# Patient Record
Sex: FEMALE | Race: OTHER | URBAN - METROPOLITAN AREA
[De-identification: names, ages, dates, MRNs, and addresses within clinical notes are randomized per-mention and may not be internally consistent; named-entity substitution may affect disease eponyms.]

---

## 2019-10-08 ENCOUNTER — OFFICE VISIT (OUTPATIENT)
Dept: VASCULAR SURGERY | Age: 59
End: 2019-10-08

## 2019-10-08 VITALS
HEART RATE: 66 BPM | SYSTOLIC BLOOD PRESSURE: 124 MMHG | DIASTOLIC BLOOD PRESSURE: 80 MMHG | WEIGHT: 143 LBS | RESPIRATION RATE: 14 BRPM | HEIGHT: 63 IN | BODY MASS INDEX: 25.34 KG/M2

## 2019-10-08 DIAGNOSIS — I87.2 VENOUS (PERIPHERAL) INSUFFICIENCY: ICD-10-CM

## 2019-10-08 DIAGNOSIS — I83.813 VARICOSE VEINS OF BOTH LOWER EXTREMITIES WITH PAIN: Primary | ICD-10-CM

## 2019-10-08 DIAGNOSIS — M54.30 SCIATICA, UNSPECIFIED LATERALITY: ICD-10-CM

## 2019-10-08 RX ORDER — DEXTROAMPHETAMINE SACCHARATE, AMPHETAMINE ASPARTATE MONOHYDRATE, DEXTROAMPHETAMINE SULFATE AND AMPHETAMINE SULFATE 7.5; 7.5; 7.5; 7.5 MG/1; MG/1; MG/1; MG/1
CAPSULE, EXTENDED RELEASE ORAL
Refills: 0 | COMMUNITY
Start: 2019-09-03

## 2019-10-09 NOTE — PROGRESS NOTES
Teddy Jacobsen    Chief Complaint   Patient presents with    New Patient    Varicose Veins       HPI    Teddy Jacobsen is a 64 y.o. female who presents to the office today with complaint of painful varicose veins in the bilateral lower extremities. Patient states that she recently moved to the area from [place]. Reportedly while she was in [place] she had some veins of her right lower leg and injected by an anesthesiologist.  She states that she did not have good result with this as she developed pain and bruising at this site which she feels has not resolved fully. 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 She does get some swelling mostly in the ankle and lower leg region. She feels that her foot is somewhat spared. She has tried compression stockings in the past but states they make her feet go numb. She does also report a history of sciatica which she states will flare intermittently if she standing too long for work. She does also have bunions and follows with podiatry for this. She states that the compression stockings also bother her bunions. She does have varicosities which are painful to her mostly in the lower legs. She does state that her father had some varicose veins as well. She has no personal history of DVT. No fevers or chills reported. She has no history of nonhealing ulcers. She does not describe any symptoms of claudication and certainly has no true rest pain. She does complain of some intermittent nighttime cramps. Otherwise she feels that she is a fairly healthy individual.    History reviewed. No pertinent past medical history. There is no problem list on file for this patient.     Past Surgical History:   Procedure Laterality Date    HX GYN      ovary removal and fallopian tube removal.     HX ORTHOPAEDIC      herniated disc     Current Outpatient Medications   Medication Sig Dispense Refill    amphetamine-dextroamphetamine XR (ADDERALL XR) 30 mg XR capsule TAKE ONE CAPSULE BY MOUTH IN THE MORNING  0     No Known Allergies  Social History     Socioeconomic History    Marital status:      Spouse name: Not on file    Number of children: Not on file    Years of education: Not on file    Highest education level: Not on file   Occupational History    Not on file   Social Needs    Financial resource strain: Not on file    Food insecurity:     Worry: Not on file     Inability: Not on file    Transportation needs:     Medical: Not on file     Non-medical: Not on file   Tobacco Use    Smoking status: Light Tobacco Smoker    Smokeless tobacco: Never Used   Substance and Sexual Activity    Alcohol use: Not on file    Drug use: Not on file    Sexual activity: Not on file   Lifestyle    Physical activity:     Days per week: Not on file     Minutes per session: Not on file    Stress: Not on file   Relationships    Social connections:     Talks on phone: Not on file     Gets together: Not on file     Attends Mormonism service: Not on file     Active member of club or organization: Not on file     Attends meetings of clubs or organizations: Not on file     Relationship status: Not on file    Intimate partner violence:     Fear of current or ex partner: Not on file     Emotionally abused: Not on file     Physically abused: Not on file     Forced sexual activity: Not on file   Other Topics Concern    Not on file   Social History Narrative    Not on file      History reviewed. No pertinent family history.     Review of Systems    Constitutional: negative   HEENT: negative   Respiratory: negative   Cardiovascular: negative   Gastrointestinal: negative   Genitourinary:negative   Hematologic/lymphatic: negative   Musculoskeletal: Positive for bilateral lower extremity varicose veins which are painful, leg edema  Neurological: negative   Behavioral/Psych: negative   Endocrine: negative Allergic/Immunologic: negative      Physical Exam:    Visit Vitals  /80 (BP 1 Location: Left arm, BP Patient Position: Sitting)   Pulse 66   Resp 14   Ht 5' 3\" (1.6 m)   Wt 143 lb (64.9 kg)   BMI 25.33 kg/m²      General: Well-appearing female in no acute distress   HEENT: EOMI, no scleral icterus is noted. Cardiovascular: RRR, palpable pedal pulses bilaterally  Pulmonary: No increased work or breathing is noted. Clear to auscultation bilaterally. Abdomen:  soft, nondistended. Extremities: Warm and well perfused bilaterally. No significant bilateral lower extremity edema on exam today. She does have some tortuous varicosities extending from below the knee to the feet bilaterally. No ulcers  Neuro: Cranial nerves II through XII are grossly intact   Integument: No ulcerations are identified visibly      Impression and Plan:  Rey Guzman is a 64 y.o. female with painful varicose veins and chronic venous insufficiency. I discussed the role of compression therapy and leg elevation at great length with patient. She was given a prescription for compression stockings in the office today. She was also given educational material in the office today. Discussed that we will obtain venous reflux studies and have her follow-up afterwards for further surgical discussion. Patient expresses understanding to all of this and agrees to the plan. PLEASE NOTE:  This document has been produced using voice recognition software. Unrecognized errors in transcription may be present.

## 2019-11-12 ENCOUNTER — OFFICE VISIT (OUTPATIENT)
Dept: VASCULAR SURGERY | Age: 59
End: 2019-11-12

## 2019-11-12 VITALS
WEIGHT: 143 LBS | SYSTOLIC BLOOD PRESSURE: 126 MMHG | RESPIRATION RATE: 18 BRPM | BODY MASS INDEX: 25.34 KG/M2 | HEIGHT: 63 IN | DIASTOLIC BLOOD PRESSURE: 72 MMHG | HEART RATE: 70 BPM

## 2019-11-12 DIAGNOSIS — I87.2 VENOUS (PERIPHERAL) INSUFFICIENCY: Primary | ICD-10-CM

## 2019-11-12 DIAGNOSIS — M21.611 BILATERAL BUNIONS: ICD-10-CM

## 2019-11-12 DIAGNOSIS — M21.612 BILATERAL BUNIONS: ICD-10-CM

## 2019-11-12 DIAGNOSIS — I83.813 VARICOSE VEINS OF BOTH LOWER EXTREMITIES WITH PAIN: ICD-10-CM

## 2019-11-12 DIAGNOSIS — M54.30 SCIATICA, UNSPECIFIED LATERALITY: ICD-10-CM

## 2019-11-12 NOTE — PROGRESS NOTES
Maryam Leija    Chief Complaint   Patient presents with    Leg Pain    Leg Swelling       HPI    Maryam Leija is a 64 y.o. female who presents to the office today in follow up for painful varicose veins in the bilateral lower extremities. She recently moved to the area from [place]. Reportedly while she was in [place] she had some veins of her right lower leg and injected by an anesthesiologist.  She states that she did not have good result with this as she developed pain and bruising at this site which she feels has not resolved fully. 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 She states that she has reduced the amount of standing she is doing due to her leg pain. She does get some swelling mostly in the ankle and lower leg region. She feels that her foot is somewhat spared. She has tried compression stockings in the past but states they make her feet go numb. She tried to use Tubigrip and copper fit stockings but feels that they do not help much for her leg pain. She does also report a history of sciatica which she states will flare intermittently if she standing too long for work. She does also have bunions and is asking about a referral to podiatry for this issue. She does state that the bunions are quite painful as well. She does have varicosities which are painful mostly in the lower legs. She does state that her father had some varicose veins as well. She has no personal history of DVT. No fevers or chills reported. She has no history of nonhealing ulcers. She does complain of some intermittent nighttime cramps. She does exercise on a regular basis but states this does not seem to help with her symptoms. She did have venous reflux studies which showed \"No evidence of DVT within bilateral lower extremities. Venous reflux noted within the right greater saphenous vein up to 7.4 seconds with significant vein dilatation.  Venous insufficiency noted within the left greater saphenous vein up to 7 seconds with significant vein dilatation. Past Medical History:   Diagnosis Date    Sciatica     Varicose vein of leg     Venous insufficiency      There is no problem list on file for this patient.     Past Surgical History:   Procedure Laterality Date    HX GYN      ovary removal and fallopian tube removal.     HX ORTHOPAEDIC      herniated disc     Current Outpatient Medications   Medication Sig Dispense Refill    amphetamine-dextroamphetamine XR (ADDERALL XR) 30 mg XR capsule TAKE ONE CAPSULE BY MOUTH IN THE MORNING  0     No Known Allergies  Social History     Socioeconomic History    Marital status:      Spouse name: Not on file    Number of children: Not on file    Years of education: Not on file    Highest education level: Not on file   Occupational History    Not on file   Social Needs    Financial resource strain: Not on file    Food insecurity:     Worry: Not on file     Inability: Not on file    Transportation needs:     Medical: Not on file     Non-medical: Not on file   Tobacco Use    Smoking status: Light Tobacco Smoker    Smokeless tobacco: Never Used   Substance and Sexual Activity    Alcohol use: Not on file    Drug use: Not on file    Sexual activity: Not on file   Lifestyle    Physical activity:     Days per week: Not on file     Minutes per session: Not on file    Stress: Not on file   Relationships    Social connections:     Talks on phone: Not on file     Gets together: Not on file     Attends Gnosticism service: Not on file     Active member of club or organization: Not on file     Attends meetings of clubs or organizations: Not on file     Relationship status: Not on file    Intimate partner violence:     Fear of current or ex partner: Not on file     Emotionally abused: Not on file     Physically abused: Not on file     Forced sexual activity: Not on file   Other Topics Concern    Not on file   Social History Narrative    Not on file      History reviewed. No pertinent family history. Physical Exam:    Visit Vitals  /72 (BP 1 Location: Left arm, BP Patient Position: Sitting)   Pulse 70   Resp 18   Ht 5' 3\" (1.6 m)   Wt 143 lb (64.9 kg)   BMI 25.33 kg/m²      General: Well-appearing female in no acute distress   HEENT: EOMI, no scleral icterus is noted. Pulmonary: No increased work or breathing is noted. Abdomen:  soft, nondistended. Extremities: Warm and well perfused bilaterally. No significant bilateral lower extremity edema on exam today. Neuro: Cranial nerves II through XII are grossly intact   Integument: No ulcerations are identified visibly      Impression and Plan:  Rodrigo Jauregui is a 64 y.o. female with painful varicose veins and chronic venous insufficiency. I once again discussed the role of compression therapy and leg elevation with her at great length. She was given a prescription for thigh-high compression stockings of 20 to 30 mm of 3 today in the office. She was also given educational material.  I discussed that she would benefit from closure procedures in the bilateral greater saphenous veins. I think that this will give her good results and resolution of her pain symptoms. Risk versus benefits of the procedure were discussed with her at length. Patient would like to move forward with procedures at this time. Patient expresses understanding to all of this and agrees to the plan. PLEASE NOTE:  This document has been produced using voice recognition software. Unrecognized errors in transcription may be present.

## 2020-01-20 ENCOUNTER — DOCUMENTATION ONLY (OUTPATIENT)
Dept: VASCULAR SURGERY | Age: 60
End: 2020-01-20

## 2020-01-20 NOTE — PROGRESS NOTES
Patient called and left message and I returned the call. Explained to the patient, that she is only able to walk on the treadmill and she is not able to lift more than 20 lbs. She explained to me that she doesn't take any blood thinners. She would like the ativan to be called to [facility]. I asked the patient regarding her stocking and she states that they are 20-30 firm. She states that's what they are. Explained she needs to bring them with her to appointment as well as the ativan that will be called in. Patient confirmed a 21 [phone] arrival time To our office. She will have transportation home.

## 2020-01-21 DIAGNOSIS — I83.813 VARICOSE VEINS OF BOTH LOWER EXTREMITIES WITH PAIN: Primary | ICD-10-CM

## 2020-01-21 RX ORDER — LORAZEPAM 1 MG/1
TABLET ORAL
Qty: 3 TAB | Refills: 0
Start: 2020-01-21 | End: 2020-01-31 | Stop reason: SDUPTHER

## 2020-01-21 NOTE — TELEPHONE ENCOUNTER
Order for ativan 1 mg to be taken as directed and bring to procedure #3/0 per verbal order from Dr. Yesika Harris.

## 2020-01-22 ENCOUNTER — CLINICAL SUPPORT (OUTPATIENT)
Dept: VASCULAR SURGERY | Age: 60
End: 2020-01-22

## 2020-01-22 VITALS
HEART RATE: 74 BPM | OXYGEN SATURATION: 97 % | WEIGHT: 143 LBS | DIASTOLIC BLOOD PRESSURE: 64 MMHG | RESPIRATION RATE: 18 BRPM | HEIGHT: 63 IN | SYSTOLIC BLOOD PRESSURE: 120 MMHG | BODY MASS INDEX: 25.34 KG/M2

## 2020-01-22 DIAGNOSIS — M79.89 SWELLING OF LIMB: ICD-10-CM

## 2020-01-22 DIAGNOSIS — I83.892 VARICOSE VEINS OF LEFT LOWER EXTREMITY WITH COMPLICATIONS: ICD-10-CM

## 2020-01-22 DIAGNOSIS — I87.302 CHRONIC VENOUS HYPERTENSION INVOLVING LEFT SIDE: Primary | ICD-10-CM

## 2020-01-22 DIAGNOSIS — M79.605 PAIN OF LEFT LOWER EXTREMITY: ICD-10-CM

## 2020-01-22 RX ORDER — LIDOCAINE HYDROCHLORIDE 10 MG/ML
10 INJECTION, SOLUTION EPIDURAL; INFILTRATION; INTRACAUDAL; PERINEURAL ONCE
Qty: 10 ML | Refills: 0
Start: 2020-01-22 | End: 2020-01-22

## 2020-01-22 NOTE — PROGRESS NOTES
[facility]          Chart reviewed for the following:   Emily Butterfield RN, have reviewed the medications and updated the Allergic reactions for [address] performed immediately prior to start of procedure:   Emily Butterfield RN, have performed the following reviews on Denise Meza prior to the start of the procedure:            * Patient was identified by name and date of birth   * Agreement that consent was signed and dated  * Procedure site verified   * Patient was positioned for comfort  * Verbal consent was given by patient  * Patients pain level assessment completed     Time: 1100      Date of procedure: 1/22/2020    Procedure performed by: Amarilys Bassett MD    Specimen sent to lab: no    How tolerated by patient: pt tolerated well     Discharge instructions reviewed and given to patient: yes    Comments: Pt ambulated to the room, went over forms and consent, all signed. Patient took ativan 2 mg po with water. Assisted pt with changing into gown and getting set on table. Procedure complete , pressure held for 10 min at site, pressure dressing applied , pt placed in stocking . Pt ambulated to chair with out difficulty able to dress self, escorted patient to waiting room and discharge instructions given.

## 2020-01-22 NOTE — PROGRESS NOTES
[facility]    Vicky Campos    Pre-Operative Diagnosis: Symptomatic superficial venous incompetence  Post-Operative Diagnosis: Symptomatic superficial venous incompetence  Procedure: Endovenous ablation left greater saphenous vein  Surgeon: Fernanda Alford MD   Anesthesia: Local  Estimated Blood Loss: Minimal  Complications: None    Pre pain assessment: 0 out of 10. Post pain assessment: 0 out of 10. The patient was placed on the procedure table. The left leg was prepped and draped in the usual sterile fashion. The skin was anesthetized with 1% Xylocaine. Ultrasound guided access was obtained at the below the knee level into the left greater  saphenous vein after the vein had been mapped with duplex ultrasound. The micro puncture wire was advanced and sheath placed over the wire into the saphenous vein. The RFA Closure catheter was then advanced under direct visualization to the saphenofemoral junction. The probes of the catheter were opened and the catheter was withdrawn to approximately 2 cm distal to the  saphenofemoral junction. Continuous irrigation through the catheter was done to prevent clotting. After adequate positioning of the catheter had been verified, the leg was infiltrated with Tumescent anesthesia using Lidocaine with epinephrine. It was then proceeded with radio frequency ablation of the saphenous vein starting at the  saphenofemoral level. The patient was placed in Trendelenburg. Pressure was supplied to the vein and the temperature was maintained around 120 degrees. The impedance was within range. The vein was treated down to the level of the knee with excellent results by ultrasound as demonstrated by thickening of the vein wall and no flow. Treatment time was 4 minutes. The catheter was then pulled into the sheath. The sheath was pulled out of the vein and pressure was applied.   The patient was observed for 10 minutes and appeared to be in stable condition. A thigh high compression stocking was applied. Vital signs remained stable throughput the procedure. The patient was then discharged in stable condition and follow-up was arranged.     Kaz Hernandes MD

## 2020-01-22 NOTE — PATIENT INSTRUCTIONS
POST CLOSURE INSTRUCTIONS: 
 
REST FOR THE REMAINDER OF THE DAY AND ELEVATE YOUR LEGS , YOU MAY AMBULATE. FREQUENT AMBULATION, BUT REFRAIN FROM STRENUOUS ACTIVITY, HEAVY LIFTING, OR STANDING FOR LONG PERIODS OF TIME FOR ONE WEEK. RETURN WITH IN A WEEK FOR FOLLOW UP ULTRASOUND EXAMINATION OF THE TREATED VEIN TO RULE OUT THE PRESENCE OF A CLOT IN THE VEIN. You are to wear your compression stockings for 72 hours following your procedure. After the 72 hours you are to wear your compression stockings only during the day for at least 2 weeks. Do not sleep with your stockings on , except for the initial 72 hours. WHEN POSSIBLE AVOID ACTIONS THAT COULD CAUSE A SUDDEN INCREASE IN VENOUS PRESSURE (BENDING DOWN QUICKLY, VALSALVA MANEUVER, COUGHING SNEEZING,ETC) PLEASE CALL AND ASK TO SPEAK WITH A NURSE(OR AFTER HOURS TO DOCTOR ON CALL) IF YOU EXPERIENCE: 
 
 
REDNESS OR DRAINAGE AT THE INCISION 
 
INCREASED SWELLING OF THE LEG 
 
TEMPERATURE ABOVE 100.5 ORALLY SEVERE PAIN IN THE LEGS.

## 2020-01-29 ENCOUNTER — OFFICE VISIT (OUTPATIENT)
Dept: VASCULAR SURGERY | Age: 60
End: 2020-01-29

## 2020-01-29 VITALS
WEIGHT: 143 LBS | SYSTOLIC BLOOD PRESSURE: 120 MMHG | BODY MASS INDEX: 25.34 KG/M2 | DIASTOLIC BLOOD PRESSURE: 80 MMHG | RESPIRATION RATE: 17 BRPM | HEIGHT: 63 IN

## 2020-01-29 DIAGNOSIS — I83.813 VARICOSE VEINS OF BOTH LOWER EXTREMITIES WITH PAIN: ICD-10-CM

## 2020-01-29 DIAGNOSIS — M79.89 SWELLING OF LIMB: ICD-10-CM

## 2020-01-29 DIAGNOSIS — I87.2 VENOUS (PERIPHERAL) INSUFFICIENCY: Primary | ICD-10-CM

## 2020-01-29 NOTE — PROGRESS NOTES
Mateus Grady    Chief Complaint   Patient presents with    Surgical Follow-up       History and Physical    Mateus Grady is a 64 y.o. female with chronic venous insufficiency of the bilateral lower extremities who presents to the office today in follow-up after a left greater saphenous vein ablation. Follow-up imaging shows successful ablation of the left greater saphenous vein with no DVT. She does have some mild tenderness at the location of her ablation but otherwise is doing very well. Her swelling and vein pain is much improved. She does continue with good compliance with her compression stockings and leg elevation. She denies any fevers or chills. She is scheduled to have her right greater saphenous vein treated this coming Monday. Past Medical History:   Diagnosis Date    Sciatica     Varicose vein of leg     Venous insufficiency      Past Surgical History:   Procedure Laterality Date    HX GYN      ovary removal and fallopian tube removal.     HX ORTHOPAEDIC      herniated disc     There is no problem list on file for this patient. Current Outpatient Medications   Medication Sig Dispense Refill    amphetamine-dextroamphetamine XR (ADDERALL XR) 30 mg XR capsule TAKE ONE CAPSULE BY MOUTH IN THE MORNING  0    LORazepam (ATIVAN) 1 mg tablet Take as directed and bring to procedure. 3 Tab 0     No Known Allergies    Physical Exam:    Visit Vitals  /80 (BP 1 Location: Left arm, BP Patient Position: Sitting)   Resp 17   Ht 5' 3\" (1.6 m)   Wt 143 lb (64.9 kg)   BMI 25.33 kg/m²      General: Well-appearing female in no acute distress   HEENT: EOMI, no scleral icterus is noted. Pulmonary: No increased work or breathing is noted. Abdomen: nondistended. Extremities: Warm and well perfused bilaterally. Pt has no significant bilateral lower extremity edema on exam today. She does have compression stockings in place.   The varicosities to her left lower leg are actually much improved and less engorged. Neuro: Cranial nerves II through XII are grossly intact   Integument: No ulcerations are identified visibly      Impression and Plan:  Luis Armando Dong is a 64 y.o. female with chronic venous insufficiency of the bilateral lower extremities. She is now status post ablation of the left greater saphenous vein. She is doing well postoperatively. I did encourage her to continue good compliance with her compression stockings and leg elevation. She can use warm compresses as needed for her thigh tenderness. She is scheduled for treatment of her right greater saphenous vein this Monday and will follow-up accordingly. She is certainly understanding to call the office sooner as needed. Plan was discussed. Patient expresses understanding and agrees. We reviewed the plan with the patient and the patient understands. We also gave the patient appropriate instructions on their disease process and when to call back. Greater than 50% of this visit was spent with face to face discussion. [address] [place] [address]      PLEASE NOTE:  This document has been produced using voice recognition software. Unrecognized errors in transcription may be present.

## 2020-01-29 NOTE — PROGRESS NOTES
1. Have you been to an emergency room or urgent care clinic since your last visit? NO    Hospitalized since your last visit? If yes, where, when, and reason for visit? NO  2. Have you seen or consulted any other health care providers outside of the [facility] since your last visit including any procedures, health maintenance items. If yes, where, when and reason for visit?  NO

## 2020-01-31 ENCOUNTER — DOCUMENTATION ONLY (OUTPATIENT)
Dept: VASCULAR SURGERY | Age: 60
End: 2020-01-31

## 2020-01-31 DIAGNOSIS — I83.813 VARICOSE VEINS OF BOTH LOWER EXTREMITIES WITH PAIN: ICD-10-CM

## 2020-01-31 RX ORDER — LORAZEPAM 1 MG/1
TABLET ORAL
Qty: 3 TAB | Refills: 0
Start: 2020-01-31

## 2020-01-31 NOTE — PROGRESS NOTES
Called patient and confirmed procedure. She will have transportation to and from. She would [facility]. She has her stockings.

## 2020-02-03 ENCOUNTER — CLINICAL SUPPORT (OUTPATIENT)
Dept: VASCULAR SURGERY | Age: 60
End: 2020-02-03

## 2020-02-03 VITALS
RESPIRATION RATE: 18 BRPM | BODY MASS INDEX: 25.34 KG/M2 | SYSTOLIC BLOOD PRESSURE: 126 MMHG | HEART RATE: 74 BPM | DIASTOLIC BLOOD PRESSURE: 70 MMHG | HEIGHT: 63 IN | WEIGHT: 143 LBS | OXYGEN SATURATION: 96 %

## 2020-02-03 DIAGNOSIS — I87.303 CHRONIC VENOUS HYPERTENSION INVOLVING BOTH SIDES: Primary | ICD-10-CM

## 2020-02-03 DIAGNOSIS — M79.89 SWELLING OF LIMB: ICD-10-CM

## 2020-02-03 DIAGNOSIS — M79.604 PAIN IN BOTH LOWER EXTREMITIES: ICD-10-CM

## 2020-02-03 DIAGNOSIS — I83.893 VARICOSE VEINS OF BOTH LOWER EXTREMITIES WITH COMPLICATIONS: ICD-10-CM

## 2020-02-03 DIAGNOSIS — M79.605 PAIN IN BOTH LOWER EXTREMITIES: ICD-10-CM

## 2020-02-03 RX ORDER — LIDOCAINE HYDROCHLORIDE 10 MG/ML
10 INJECTION, SOLUTION EPIDURAL; INFILTRATION; INTRACAUDAL; PERINEURAL ONCE
Qty: 10 ML | Refills: 0
Start: 2020-02-03 | End: 2020-02-03

## 2020-02-03 NOTE — PROGRESS NOTES
1. Have you been to the ER, urgent care clinic since your last visit? Hospitalized since your last visit? No    2. Have you seen or consulted any other health care providers outside of the [facility] since your last visit? Include any pap smears or colon screening.  No       3 most recent PHQ Screens 2/3/2020   Little interest or pleasure in doing things Not at all   Feeling down, depressed, irritable, or hopeless Not at all   Total Score PHQ 2 0

## 2020-02-03 NOTE — PROGRESS NOTES
[facility]    Vicky Campos    Pre-Operative Diagnosis: Symptomatic superficial venous incompetence  Post-Operative Diagnosis: Symptomatic superficial venous incompetence  Procedure: Endovenous ablation right greater saphenous vein  Surgeon: Fernanda Alford MD   Anesthesia: Local  Estimated Blood Loss: Minimal  Complications: None    Pre pain assessment: 0 out of 10. Post pain assessment: 0 out of 10. The patient was placed on the procedure table. The right leg was prepped and draped in the usual sterile fashion. The skin was anesthetized with 1% Xylocaine. Ultrasound guided access was obtained at the below the knee level into the right greater  saphenous vein after the vein had been mapped with duplex ultrasound. The micro puncture wire was advanced and sheath placed over the wire into the saphenous vein. The RFA Closure catheter was then advanced under direct visualization to the saphenofemoral junction. The probes of the catheter were opened and the catheter was withdrawn to approximately 2 cm distal to the  saphenofemoral junction. Continuous irrigation through the catheter was done to prevent clotting. After adequate positioning of the catheter had been verified, the leg was infiltrated with Tumescent anesthesia using Lidocaine with epinephrine. It was then proceeded with radio frequency ablation of the saphenous vein starting at the  saphenofemoral level. The patient was placed in Trendelenburg. Pressure was supplied to the vein and the temperature was maintained around 120 degrees. The impedance was within range. The vein was treated down to the level of the knee with excellent results by ultrasound as demonstrated by thickening of the vein wall and no flow. Treatment time was 4 minutes. The catheter was then pulled into the sheath. The sheath was pulled out of the vein and pressure was applied.   The patient was observed for 10 minutes and appeared to be in stable condition. A thigh high compression stocking was applied. Vital signs remained stable throughput the procedure. The patient was then discharged in stable condition and follow-up was arranged.     Michael Brasher MD no

## 2020-02-03 NOTE — PROGRESS NOTES
[facility]          Chart reviewed for the following:   Griselda Fernando RN, have reviewed the medications and updated the Allergic reactions for [address] performed immediately prior to start of procedure:   Griselda Fernando RN, have performed the following reviews on Kyle Durham prior to the start of the procedure:            * Patient was identified by name and date of birth   * Agreement that consent was signed and dated  * Procedure site verified   * Patient was positioned for comfort  * Verbal consent was given by patient  * Patients pain level assessment completed     Time: 1230      Date of procedure: 2/3/2020    Procedure performed by: Michael Brasher MD    Specimen sent to lab: no    How tolerated by patient: tolerated well     Discharge instructions reviewed and given to patient: yes    Comments:patient consented to procedure , right side verified , patient tolerated procedure well with no issues. Pressure was held for 10 mins , pressure dressing applied , patient placed in thigh high stocking and patient escorted to waiting room. Patient declined ativan.

## 2020-02-10 ENCOUNTER — OFFICE VISIT (OUTPATIENT)
Dept: VASCULAR SURGERY | Age: 60
End: 2020-02-10

## 2020-02-10 VITALS
BODY MASS INDEX: 25.34 KG/M2 | DIASTOLIC BLOOD PRESSURE: 80 MMHG | HEIGHT: 63 IN | OXYGEN SATURATION: 98 % | WEIGHT: 143 LBS | SYSTOLIC BLOOD PRESSURE: 120 MMHG | RESPIRATION RATE: 18 BRPM | HEART RATE: 81 BPM

## 2020-02-10 DIAGNOSIS — M79.604 PAIN IN BOTH LOWER EXTREMITIES: ICD-10-CM

## 2020-02-10 DIAGNOSIS — I87.303 CHRONIC VENOUS HYPERTENSION INVOLVING BOTH SIDES: ICD-10-CM

## 2020-02-10 DIAGNOSIS — I83.893 VARICOSE VEINS OF BOTH LOWER EXTREMITIES WITH COMPLICATIONS: Primary | ICD-10-CM

## 2020-02-10 DIAGNOSIS — M79.605 PAIN IN BOTH LOWER EXTREMITIES: ICD-10-CM

## 2020-02-10 NOTE — PROGRESS NOTES
/1. Have you been to the ER, urgent care clinic since your last visit? Hospitalized since your last visit? No    2. Have you seen or consulted any other health care providers outside of the [address] [place] [address] since your last visit? Include any pap smears or colon screening.  No       3 most recent PHQ Screens 2/10/2020   Little interest or pleasure in doing things Not at all   Feeling down, depressed, irritable, or hopeless Not at all   Total Score PHQ 2 0

## 2020-02-10 NOTE — PROGRESS NOTES
Sosa Salcedo    Chief Complaint   Patient presents with    Varicose Veins       History and Physical    Sosa Salcedo is a 64 y.o. female with chronic venous insufficiency of the bilateral lower extremities who presents to the office today in follow-up after a right greater saphenous vein ablation. Follow-up imaging shows successful ablation of the right greater saphenous vein with no DVT or EHIT. She has had left GSV ablation previously. She is doing quite well postoperatively. She has started back exercising but states she is taking it easy and plans to slowly increase her intensity over the next week or two. She does have some mild tenderness at the location of her left GSV ablation but otherwise is doing very well. Her swelling and vein pain is much improved. She does continue with good compliance with her compression stockings and leg elevation. She denies any fevers or chills. She does have a few small varicose veins on the bilateral lower legs still, L>R. Past Medical History:   Diagnosis Date    Sciatica     Varicose vein of leg     Venous insufficiency      Past Surgical History:   Procedure Laterality Date    HX GYN      ovary removal and fallopian tube removal.     HX ORTHOPAEDIC      herniated disc     There is no problem list on file for this patient. Current Outpatient Medications   Medication Sig Dispense Refill    amphetamine-dextroamphetamine XR (ADDERALL XR) 30 mg XR capsule TAKE ONE CAPSULE BY MOUTH IN THE MORNING  0    LORazepam (ATIVAN) 1 mg tablet Take as directed and bring to procedure. 3 Tab 0     No Known Allergies    Physical Exam:    Visit Vitals  /80 (BP 1 Location: Left arm, BP Patient Position: Sitting)   Pulse 81   Resp 18   Ht 5' 3\" (1.6 m)   Wt 143 lb (64.9 kg)   SpO2 98%   BMI 25.33 kg/m²      General: Well-appearing female in no acute distress   HEENT: EOMI, no scleral icterus is noted. Pulmonary: No increased work or breathing is noted.    Abdomen: nondistended. Extremities: Warm and well perfused bilaterally. Pt has no significant bilateral lower extremity edema on exam today. She does have compression stockings in place. Neuro: Cranial nerves II through XII are grossly intact   Integument: No ulcerations are identified visibly      Impression and Plan:  Denise Meza is a 64 y.o. female with chronic venous insufficiency of the bilateral lower extremities. She is now status post ablation of the right greater saphenous vein. She had ablation of the left GSV previously. She is doing well postoperatively. I did encourage her to continue good compliance with her compression stockings and leg elevation. She is interested in having the varicose veins to her lower legs treated. I discussed that we will have her back in one months time to reassess and if these veins are still bothering her we can perform injections at that time. She is certainly understanding to call the office sooner as needed. Plan was discussed. Patient expresses understanding and agrees. We reviewed the plan with the patient and the patient understands. We also gave the patient appropriate instructions on their disease process and when to call back. Greater than 50% of this visit was spent with face to face discussion. [address] [place] [address]      PLEASE NOTE:  This document has been produced using voice recognition software. Unrecognized errors in transcription may be present.

## 2020-02-24 ENCOUNTER — TELEPHONE (OUTPATIENT)
Dept: VASCULAR SURGERY | Age: 60
End: 2020-02-24

## 2020-02-24 NOTE — TELEPHONE ENCOUNTER
----- Message from Florencio Miranda sent at 2/10/2020 11:29 AM EST -----  Patient is scheduled for injections with Karol Dueñas for 3/11/20,3/23/2020,and 4/6/2020.  Left leg injection and the patient said she had some on her right also

## 2020-03-31 ENCOUNTER — OFFICE VISIT (OUTPATIENT)
Dept: VASCULAR SURGERY | Age: 60
End: 2020-03-31

## 2020-03-31 DIAGNOSIS — I87.2 VENOUS (PERIPHERAL) INSUFFICIENCY: Primary | ICD-10-CM

## 2020-03-31 NOTE — PROGRESS NOTES
Patient is concerned about an area to left lower leg just below the knee that has a hard feel. Patient had a left GSV ablation back in January. Explained to patient when the saphenous vein is ablated, if there were any branches of veins off of the saphenous vein that sometimes these areas will clot off and will present with hard to touch areas that sometimes can be touchy and tender or even give the area a different color. This is okay but suggest to apply warm compress to help this area dissolve. Patient stated understood. Patient would call back with any new questions or concerns.

## 2020-05-27 ENCOUNTER — OFFICE VISIT (OUTPATIENT)
Dept: VASCULAR SURGERY | Age: 60
End: 2020-05-27

## 2020-05-27 DIAGNOSIS — I87.2 CHRONIC VENOUS INSUFFICIENCY: Primary | ICD-10-CM

## 2020-05-27 DIAGNOSIS — I83.893 VARICOSE VEINS OF BILATERAL LOWER EXTREMITIES WITH OTHER COMPLICATIONS: ICD-10-CM

## 2020-05-27 RX ORDER — POLIDOCANOL 0.01 G/ML
2 INJECTION, SOLUTION INTRAVENOUS ONCE
Qty: 2 ML | Refills: 0
Start: 2020-05-27 | End: 2020-05-27

## 2020-05-27 NOTE — PROGRESS NOTES
Heather Thomas    No chief complaint on file. History and Physical    Heather Thomas is a 64 y.o. female with now class II chronic venous insufficiency of bilateral lower extremities. She is here for venous injection of varicosities. Overall seems to doing fairly well but does have unsightly and discomforting varicose veins of bilateral lower extremities on the anterior shins. No ulcerations or claudication at this time. All of her previous edema has fully resolved. Past Medical History:   Diagnosis Date    Sciatica     Varicose vein of leg     Venous insufficiency      Past Surgical History:   Procedure Laterality Date    HX GYN      ovary removal and fallopian tube removal.     HX ORTHOPAEDIC      herniated disc     Patient Active Problem List   Diagnosis Code    Chronic venous insufficiency I87.2    Varicose veins of bilateral lower extremities with other complications L33.953     Current Outpatient Medications   Medication Sig Dispense Refill    polidocanoL (Asclera) 1 % (20 mg/2 mL) soln injection 2 mL by IntraVENous route once for 1 dose. 2 mL 0    LORazepam (ATIVAN) 1 mg tablet Take as directed and bring to procedure.  3 Tab 0    amphetamine-dextroamphetamine XR (ADDERALL XR) 30 mg XR capsule TAKE ONE CAPSULE BY MOUTH IN THE MORNING  0     No Known Allergies  Social History     Socioeconomic History    Marital status:      Spouse name: Not on file    Number of children: Not on file    Years of education: Not on file    Highest education level: Not on file   Occupational History    Not on file   Social Needs    Financial resource strain: Not on file    Food insecurity     Worry: Not on file     Inability: Not on file    Transportation needs     Medical: Not on file     Non-medical: Not on file   Tobacco Use    Smoking status: Light Tobacco Smoker    Smokeless tobacco: Never Used   Substance and Sexual Activity    Alcohol use: Not on file    Drug use: Not on file    Sexual activity: Not on file   Lifestyle    Physical activity     Days per week: Not on file     Minutes per session: Not on file    Stress: Not on file   Relationships    Social connections     Talks on phone: Not on file     Gets together: Not on file     Attends Mor[employer] service: Not on file     Active member of club or organization: Not on file     Attends meetings of clubs or organizations: Not on file     Relationship status: Not on file    Intimate partner violence     Fear of current or ex partner: Not on file     Emotionally abused: Not on file     Physically abused: Not on file     Forced sexual activity: Not on file   Other Topics Concern    Not on file   Social History Narrative    Not on file      No family history on file. Physical Exam:    There were no vitals taken for this visit. General: Well-appearing female no acute distress  HEENT: EOMI no scleral icterus is noted patient is wearing a mask  Pulmonary: No increased work of breathing is noted  Extremities: Warm and perfused bilaterally she does have large bulging varicosity over bilateral anterior shin on the left lower extremity she also has a large varicosity just below her knee no ulcerations are identified bilaterally no edema identified bilaterally  Neuro: Cranial nerves II through XII are grossly intact normal mentation and speech    Impression and Plan:  Sherif Bowser is a 64 y.o. female with class II chronic venous insufficiency of bilateral lower extremities here for injections of her varicosities. She was appropriately consented prior to the procedure. Patient was prepped and draped in a normal sterile fashion according to CDC guidelines aseptic technique. She was given a combination of foam sclerotherapy to the large varicosities bilaterally using 3-1 air to sclerosant ratio physician performed foam sclerotherapy.   As well as straight sclerotherapy injections on the left lower extremity to some of the branches off the large varicosity. Overall we were able to treat all of her varicosities with less than 2 mL of sclerosant. Patient tolerated procedure well without any issues. She was appropriately dressed and we will see her back in 2 to 3 weeks for possible further injections if necessary. We reviewed the plan with the patient and the patient understands. We also gave the patient appropriate instructions on their disease process and when to call back. Greater than 50% of this visit was spent with face to face discussion. Follow-up and Dispositions    · Return in about 3 weeks (around 6/17/2020). Daren Cunningham MD    PLEASE NOTE:  This document has been produced using voice recognition software. Unrecognized errors in transcription may be present.

## 2020-06-10 ENCOUNTER — OFFICE VISIT (OUTPATIENT)
Dept: VASCULAR SURGERY | Age: 60
End: 2020-06-10

## 2020-06-10 VITALS — RESPIRATION RATE: 18 BRPM | WEIGHT: 143 LBS | BODY MASS INDEX: 25.34 KG/M2 | HEIGHT: 63 IN

## 2020-06-10 DIAGNOSIS — I87.2 CHRONIC VENOUS INSUFFICIENCY: Primary | ICD-10-CM

## 2020-06-10 DIAGNOSIS — I83.893 VARICOSE VEINS OF BILATERAL LOWER EXTREMITIES WITH OTHER COMPLICATIONS: ICD-10-CM

## 2020-06-10 NOTE — PROGRESS NOTES
Goran Magana    Chief Complaint   Patient presents with    Varicose Veins       History and Physical    Goran Magana is a 64 y.o. female who had recent injections of her varicose veins. She artery seems to doing quite well. Unfortunately she really hurt her back and is not really walking all that much or doing all that much. But she states that she is very happy with her current vein appearances. At this current time she has no pain within her lower extremities no difficulty with swelling at this time. No fevers or chills or ulcerations. Past Medical History:   Diagnosis Date    Sciatica     Varicose vein of leg     Venous insufficiency      Past Surgical History:   Procedure Laterality Date    HX GYN      ovary removal and fallopian tube removal.     HX ORTHOPAEDIC      herniated disc     Patient Active Problem List   Diagnosis Code    Chronic venous insufficiency I87.2    Varicose veins of bilateral lower extremities with other complications H07.734     Current Outpatient Medications   Medication Sig Dispense Refill    amphetamine-dextroamphetamine XR (ADDERALL XR) 30 mg XR capsule TAKE ONE CAPSULE BY MOUTH IN THE MORNING  0    LORazepam (ATIVAN) 1 mg tablet Take as directed and bring to procedure.  3 Tab 0     No Known Allergies  Social History     Socioeconomic History    Marital status:      Spouse name: Not on file    Number of children: Not on file    Years of education: Not on file    Highest education level: Not on file   Occupational History    Not on file   Social Needs    Financial resource strain: Not on file    Food insecurity     Worry: Not on file     Inability: Not on file    Transportation needs     Medical: Not on file     Non-medical: Not on file   Tobacco Use    Smoking status: Light Tobacco Smoker    Smokeless tobacco: Never Used   Substance and Sexual Activity    Alcohol use: Not on file    Drug use: Not on file    Sexual activity: Not on file   Lifestyle  Physical activity     Days per week: Not on file     Minutes per session: Not on file    Stress: Not on file   Relationships    Social connections     Talks on phone: Not on file     Gets together: Not on file     Attends Anabaptist service: Not on file     Active member of club or organization: Not on file     Attends meetings of clubs or organizations: Not on file     Relationship status: Not on file    Intimate partner violence     Fear of current or ex partner: Not on file     Emotionally abused: Not on file     Physically abused: Not on file     Forced sexual activity: Not on file   Other Topics Concern    Not on file   Social History Narrative    Not on file      History reviewed. No pertinent family history. Physical Exam:    Visit Vitals  Resp 18   Ht 5' 3\" (1.6 m)   Wt 143 lb (64.9 kg)   BMI 25.33 kg/m²      General: Well-appearing female no acute distress  HEENT: EOMI no scleral icterus is noted patient is wearing a mask  Pulmonary: No increased work of breathing is noted  Extremities: Warm and well-perfused bilaterally she does have some minor varicosities of bilateral lower extremities but no edema in today's visit no ulcerations are identified  Neuro: Cranial nerves II through XII are grossly intact with normal mentation and speech    Impression and Plan:  Claudia Daniel is a 64 y.o. female with class II chronic venous insufficiency of bilateral lower extremities. We will hold off on injections today given all of her back issues. She also wants to make sure that she continues to do very well. I think that her veins look really well there is a chance that she may not need another injection. But she wants to give it another week to make sure that she is able to actually be walking around and that there is no changes. I told her that is perfectly fine we will see her again next week for either injections or stopping. We reviewed the plan with the patient and the patient understands.   We also gave the patient appropriate instructions on their disease process and when to call back. Greater than 50% of this visit was spent with face to face discussion. Follow-up and Dispositions    · Return in about 1 week (around 6/17/2020). Deborah Benjamin MD    PLEASE NOTE:  This document has been produced using voice recognition software. Unrecognized errors in transcription may be present.

## 2020-06-10 NOTE — PROGRESS NOTES
1. Have you been to an emergency room or urgent care clinic since your last visit? NO  Hospitalized since your last visit? If yes, where, when, and reason for visit? no  2. Have you seen or consulted any other health care providers outside of the [facility] since your last visit including any procedures, health maintenance items. If yes, where, when and reason for visit?  NO

## 2020-06-17 ENCOUNTER — OFFICE VISIT (OUTPATIENT)
Dept: VASCULAR SURGERY | Age: 60
End: 2020-06-17

## 2020-06-17 DIAGNOSIS — I83.893 VARICOSE VEINS OF BILATERAL LOWER EXTREMITIES WITH OTHER COMPLICATIONS: Primary | ICD-10-CM

## 2020-06-17 DIAGNOSIS — I87.2 CHRONIC VENOUS INSUFFICIENCY: ICD-10-CM

## 2020-06-17 RX ORDER — POLIDOCANOL 0.01 G/ML
2 INJECTION, SOLUTION INTRAVENOUS ONCE
Qty: 2 ML | Refills: 0
Start: 2020-06-17 | End: 2020-06-17

## 2020-06-17 NOTE — PROGRESS NOTES
Sophia Bishop    No chief complaint on file. History and Physical    Sophia Bishop is a 64 y.o. female with class II chronic venous insufficiency of bilateral lower extremities. Patient is here for injections of bilateral lower extremities. She does have some bulging varicosities of bilateral lower extremities on the anterior calves. No fevers or chills or edema. No claudication or rest pain at this time. Past Medical History:   Diagnosis Date    Sciatica     Varicose vein of leg     Venous insufficiency      Past Surgical History:   Procedure Laterality Date    HX GYN      ovary removal and fallopian tube removal.     HX ORTHOPAEDIC      herniated disc     Patient Active Problem List   Diagnosis Code    Chronic venous insufficiency I87.2    Varicose veins of bilateral lower extremities with other complications B76.150     Current Outpatient Medications   Medication Sig Dispense Refill    LORazepam (ATIVAN) 1 mg tablet Take as directed and bring to procedure.  3 Tab 0    amphetamine-dextroamphetamine XR (ADDERALL XR) 30 mg XR capsule TAKE ONE CAPSULE BY MOUTH IN THE MORNING  0     No Known Allergies  Social History     Socioeconomic History    Marital status:      Spouse name: Not on file    Number of children: Not on file    Years of education: Not on file    Highest education level: Not on file   Occupational History    Not on file   Social Needs    Financial resource strain: Not on file    Food insecurity     Worry: Not on file     Inability: Not on file    Transportation needs     Medical: Not on file     Non-medical: Not on file   Tobacco Use    Smoking status: Light Tobacco Smoker    Smokeless tobacco: Never Used   Substance and Sexual Activity    Alcohol use: Not on file    Drug use: Not on file    Sexual activity: Not on file   Lifestyle    Physical activity     Days per week: Not on file     Minutes per session: Not on file    Stress: Not on file   Relationships    Social connections     Talks on phone: Not on file     Gets together: Not on file     Attends Zoroastrianism service: Not on file     Active member of club or organization: Not on file     Attends meetings of clubs or organizations: Not on file     Relationship status: Not on file    Intimate partner violence     Fear of current or ex partner: Not on file     Emotionally abused: Not on file     Physically abused: Not on file     Forced sexual activity: Not on file   Other Topics Concern    Not on file   Social History Narrative    Not on file      No family history on file. Physical Exam:    There were no vitals taken for this visit. General: Well-appearing female no acute distress  HEENT: EOMI no scleral icterus is noted patient is wearing a mask  Pulmonary: No crease work of breathing is noted  Extremities: Bilateral lower extremities are warm and well-perfused no edema is noted bilaterally no skin changes noted bilaterally she does continue to have some bulging varicosities of bilateral lower extremities on the anterior calves. Neuro: Cranial nerves II through XII are grossly intact normal mentation and speech    Impression and Plan:  Radha Yan is a 64 y.o. female with class II chronic venous insufficiency of bilateral lower extremities here for injections. Patient was appropriate consented prior to the procedure. She was injected with straight sclerosant total of 2 mL bilaterally mostly on the left. Patient tolerated procedure well without any issues. She was dressed appropriately. Patient will come back in a month for further discussion of need for any further injections. We reviewed the plan with the patient and the patient understands. We also gave the patient appropriate instructions on their disease process and when to call back. Greater than 50% of this visit was spent with face to face discussion. Follow-up and Dispositions    · Return in about 4 weeks (around 7/15/2020).          Ke Khan Gisela Langley MD    PLEASE NOTE:  This document has been produced using voice recognition software. Unrecognized errors in transcription may be present.